# Patient Record
Sex: MALE | Race: BLACK OR AFRICAN AMERICAN | NOT HISPANIC OR LATINO | URBAN - METROPOLITAN AREA
[De-identification: names, ages, dates, MRNs, and addresses within clinical notes are randomized per-mention and may not be internally consistent; named-entity substitution may affect disease eponyms.]

---

## 2020-09-24 ENCOUNTER — EMERGENCY (EMERGENCY)
Facility: HOSPITAL | Age: 85
LOS: 1 days | Discharge: HOME | End: 2020-09-24
Attending: EMERGENCY MEDICINE | Admitting: EMERGENCY MEDICINE
Payer: MEDICARE

## 2020-09-24 VITALS
OXYGEN SATURATION: 98 % | DIASTOLIC BLOOD PRESSURE: 89 MMHG | TEMPERATURE: 99 F | SYSTOLIC BLOOD PRESSURE: 151 MMHG | HEART RATE: 73 BPM | RESPIRATION RATE: 16 BRPM

## 2020-09-24 VITALS
DIASTOLIC BLOOD PRESSURE: 103 MMHG | TEMPERATURE: 98 F | HEART RATE: 84 BPM | WEIGHT: 177.91 LBS | HEIGHT: 75 IN | SYSTOLIC BLOOD PRESSURE: 137 MMHG | OXYGEN SATURATION: 98 % | RESPIRATION RATE: 16 BRPM

## 2020-09-24 PROCEDURE — 99284 EMERGENCY DEPT VISIT MOD MDM: CPT | Mod: 25

## 2020-09-24 PROCEDURE — 99284 EMERGENCY DEPT VISIT MOD MDM: CPT

## 2020-09-24 PROCEDURE — 70450 CT HEAD/BRAIN W/O DYE: CPT | Mod: 26

## 2020-09-24 PROCEDURE — 70450 CT HEAD/BRAIN W/O DYE: CPT

## 2020-09-24 RX ORDER — LEVOTHYROXINE SODIUM 125 MCG
0 TABLET ORAL
Qty: 0 | Refills: 0 | DISCHARGE

## 2020-09-24 RX ORDER — AMLODIPINE BESYLATE 2.5 MG/1
0 TABLET ORAL
Qty: 0 | Refills: 0 | DISCHARGE

## 2020-09-24 RX ORDER — MEGESTROL ACETATE 40 MG/ML
0 SUSPENSION ORAL
Qty: 0 | Refills: 0 | DISCHARGE

## 2020-09-24 NOTE — ED PROVIDER NOTE - CARE PROVIDER_API CALL
[place] [address]  Phone: [phone]  Fax: [phone]  Follow Up Time:

## 2020-09-24 NOTE — ED PROVIDER NOTE - OBJECTIVE STATEMENT
85 year old male with PMHx of HTN, Hypothyroidism presents to the ED s/p fall yesterday. Pt mentioned he fell yesterday out in his lawn. Pt states he had slippers on without a sole so he slipped. Denies any symptoms prior to the fall. Denies head injury or LOC. Ultimately feels well today without complaints, was told to come in to the ED by his son. Denies any chest pain, SOB, dizziness, numbness, weakness, extremity pain, hip and neck pain. 85 year old male with PMHx of HTN, Hypothyroidism presents to the ED s/p fall yesterday. Pt mentioned he fell yesterday out in his lawn. Pt states he had slippers on without a sole so he slipped. Denies any symptoms prior to the fall. Denies head injury or LOC. Ultimately feels well today without complaints, was told to come in to the ED by his son. Denies any chest pain, SOB, dizziness, numbness, weakness, extremity pain, hip/neck pain, or any other complaints.

## 2020-09-24 NOTE — ED PROVIDER NOTE - NSFOLLOWUPINSTRUCTIONS_ED_ALL_ED_FT
1) Follow-up with your Primary Medical Doctor. Call today / next business day for prompt follow-up.  2) Return to Emergency room for any worsening or persistent pain, shortness of breath, chest pains, abdominal pain, numbness, tingling, headaches, vomiting, visual changes, dizziness, weakness, fever, if you are having difficulty getting around or you feel unsteady, or if you have any other concerning symptoms.  3) See attached instruction sheets for additional information, including information regarding signs and symptoms to look out for, reasons to seek immediate care and other important instructions.  4) Make sure to take your time with walking, especially when first standing up.

## 2020-09-24 NOTE — ED PROVIDER NOTE - PHYSICAL EXAMINATION
Constitutional: Awake, Alert, non-toxic. NAD. Well appearing, well nourished.   HEAD: Normocephalic, atraumatic.   EYES: PERRL, EOM intact, conjunctiva and sclera are clear bilaterally. No raccoon eyes.   ENT: TM's and canals normal, no edge sign. No rhinorrhea, normal pharynx, patent, no tonsillar exudate or enlargement, mucous membranes pink/moist, no erythema, no drooling or stridor.   NECK: Supple, non-tender; no cervical LAD, no JVD, no goiter.  BACK: No midline or paraspinal TTP of cervical/thoracic/lumbar spine, FROM. No ecchymosis or hematomas.   CARDIOVASCULAR: Normal S1, S2; regular rate and rhythm.  RESPIRATORY: Normal respiratory effort; breath sounds CTAB, no wheezes, rhonchi, or rales. Speaking in full sentences. No accessory muscle use.   ABDOMEN: Soft; non-tender, non-distended. Normal bowel sounds x 4. no palpable masses, no bruits, no CVA TTP. No guarding.   EXTREMITIES: Full passive and active ROM in all extremities; non-tender to palpation; distal pulses palpable and symmetric, no edema, no crepitus or step off  SKIN: Warm, dry; good skin turgor, no apparent lesions or rashes, no ecchymosis, brisk capillary refill.  NEURO: A&O x3. Sensory and motor functions are grossly intact. Speech is normal. Appearance and judgement seem appropriate for gender and age. No neurological deficits. Neurovascular sensation intact motor function 5/5 of upper and lower extremities, CN II-XII grossly intact, no ataxia, absent romberg and pronator drift, intact cerebellar function. Speech clear, without articulation or word-finding difficulties. Eyes- PERRL bilaterally. EOMs in tact. No nystagmus. No facial droop. Constitutional: Awake, Alert, non-toxic. NAD. Well appearing, well nourished.   HEAD: Normocephalic, atraumatic.   EYES: PERRL, EOM intact, conjunctiva and sclera are clear bilaterally. No raccoon eyes.   ENT: No edge sign. No rhinorrhea, normal pharynx, patent, no tonsillar exudate or enlargement, mucous membranes pink/moist, no erythema, no drooling or stridor.   NECK: Supple, non-tender  BACK: No midline or paraspinal TTP of cervical/thoracic/lumbar spine, FROM. No ecchymosis or hematomas.   CARDIOVASCULAR: Normal S1, S2; regular rate and rhythm.  RESPIRATORY: Normal respiratory effort; breath sounds CTAB, no wheezes, rhonchi, or rales. Speaking in full sentences. No accessory muscle use.   ABDOMEN: Soft; non-tender, non-distended.  EXTREMITIES: Full passive and active ROM in all extremities; hip/ribs/extremity non-tender to palpation; distal pulses palpable and symmetric, no edema, no crepitus or step off  SKIN: Warm, dry; good skin turgor, no apparent lesions or rashes, no ecchymosis, brisk capillary refill.  NEURO: A&O x3. Sensory and motor functions are grossly intact. Speech is normal. Appearance and judgement seem appropriate for gender and age. No neurological deficits. Neurovascular sensation intact motor function 5/5 of upper and lower extremities, CN II-XII grossly intact, no ataxia, absent romberg and pronator drift, intact cerebellar function. Speech clear, without articulation or word-finding difficulties. Eyes- PERRL bilaterally. EOMs in tact. No nystagmus. No facial droop.

## 2020-09-24 NOTE — ED PROVIDER NOTE - ATTENDING CONTRIBUTION TO CARE
86 y/o male with a PMHx of HTN and Hypothyroidism presents to the ED s/p fall x two days ago. Pt states that he was taking the garbage out, was wearing soleless shoes and slipped and fell onto the grass. States that he fell onto his butt. No head injury or LOC. Denies neck, back and extremity pain. Pt is not on any bloodthinners. Denies any COVID exposures.  PE: no ext signs of head trauma. no spinal tend (c,t,l). nl non-focal neuro exam. FROM bl ext x 4 without pain. No other acute findings.  Check CT head, outpt fu

## 2020-09-24 NOTE — ED PROVIDER NOTE - PATIENT PORTAL LINK FT
You can access the FollowMyHealth Patient Portal offered by [facility] by registering at the following website: [email or web address]. By joining [facility]’s FollowMyHealth portal, you will also be able to view your health information using other applications (apps) compatible with our system.

## 2020-09-24 NOTE — ED PROVIDER NOTE - CARE PLAN
Principal Discharge DX:	Fall at home, initial encounter  Secondary Diagnosis:	Head injuries, initial encounter

## 2020-09-24 NOTE — ED PROVIDER NOTE - CLINICAL SUMMARY MEDICAL DECISION MAKING FREE TEXT BOX
s/p fall yesterday. Pt mentioned he fell yesterday out in his lawn. Pt states he had slippers on without a sole so he slipped. Denies any symptoms prior to the fall. Denies head injury or LOC. Ultimately feels well today without complaints. benign exam, neuro intact, RRR, CTAB, no trauma findings. plan includes dc PCP followup s/p fall yesterday. Pt mentioned he fell yesterday out in his lawn. Pt states he had slippers on without a sole so he slipped. Denies any symptoms prior to the fall. Denies head injury or LOC. Ultimately feels well today without complaints. benign exam, neuro intact, RRR, CTAB, no trauma findings. plan includes Ct head r/o CVA

## 2020-09-24 NOTE — ED PROVIDER NOTE - PROGRESS NOTE DETAILS
Scribe AS for PAYAM Fonseca: spoke with son, reports pt fell two days ago, once outside on the lawn and once inside, reports no odd behavior, confusion, and no complaints but wanted to get pt checked out. Scribe AS for PAYAM Birtt: spoke with son, reports pt fell two days ago, once outside on the lawn and once inside, reports no odd behavior, confusion, and no complaints but wanted to get pt checked out. Scribe AS for Dr. Dickinson: 84 y/o male with a PMHx of HTN and Hypothyroidism presents to the ED s/p fall x two days ago. Pt states that he was taking the garbage out, was wearing soleless shoes and slipped and fell onto the grass. States that he fell onto his butt. No head injury or LOC. Denies neck, back and extremity pain. Pt is not on any bloodthinners. Denies any COVID exposures. PE: Completely normal.

## 2020-09-24 NOTE — ED ADULT NURSE NOTE - OBJECTIVE STATEMENT
Amb to ED. Pt states he fell twice 2 days ago but denies LOC or pain. States his soft soled shoe caused him to fall. "I'm here because my son thinks I should be checked out. A & O. Walks stooped over alittle but states this is his norm. ARRIAGA freely Denies any numbness or tingling In extremities.